# Patient Record
Sex: MALE | Race: WHITE | NOT HISPANIC OR LATINO
[De-identification: names, ages, dates, MRNs, and addresses within clinical notes are randomized per-mention and may not be internally consistent; named-entity substitution may affect disease eponyms.]

---

## 2017-01-10 ENCOUNTER — APPOINTMENT (OUTPATIENT)
Dept: ORTHOPEDIC SURGERY | Facility: CLINIC | Age: 52
End: 2017-01-10

## 2017-01-10 VITALS — BODY MASS INDEX: 29.16 KG/M2 | HEIGHT: 73 IN | WEIGHT: 220 LBS | RESPIRATION RATE: 16 BRPM

## 2017-01-10 PROBLEM — Z00.00 ENCOUNTER FOR PREVENTIVE HEALTH EXAMINATION: Status: ACTIVE | Noted: 2017-01-10

## 2017-01-10 RX ORDER — LAMOTRIGINE 100 MG/1
100 TABLET ORAL
Refills: 0 | Status: ACTIVE | COMMUNITY

## 2018-01-31 ENCOUNTER — APPOINTMENT (OUTPATIENT)
Dept: ORTHOPEDIC SURGERY | Facility: CLINIC | Age: 53
End: 2018-01-31
Payer: COMMERCIAL

## 2018-01-31 VITALS — BODY MASS INDEX: 29.16 KG/M2 | HEIGHT: 73 IN | RESPIRATION RATE: 16 BRPM | WEIGHT: 220 LBS

## 2018-01-31 DIAGNOSIS — M65.342 TRIGGER FINGER, LEFT RING FINGER: ICD-10-CM

## 2018-01-31 DIAGNOSIS — M65.332 TRIGGER FINGER, LEFT MIDDLE FINGER: ICD-10-CM

## 2018-01-31 DIAGNOSIS — M65.351 TRIGGER FINGER, RIGHT LITTLE FINGER: ICD-10-CM

## 2018-01-31 DIAGNOSIS — M65.352 TRIGGER FINGER, LEFT LITTLE FINGER: ICD-10-CM

## 2018-01-31 PROCEDURE — 20550 NJX 1 TENDON SHEATH/LIGAMENT: CPT | Mod: LT

## 2018-01-31 PROCEDURE — 99214 OFFICE O/P EST MOD 30 MIN: CPT | Mod: 25

## 2020-02-19 ENCOUNTER — APPOINTMENT (OUTPATIENT)
Dept: UROLOGY | Facility: CLINIC | Age: 55
End: 2020-02-19
Payer: COMMERCIAL

## 2020-02-19 VITALS
SYSTOLIC BLOOD PRESSURE: 123 MMHG | HEART RATE: 80 BPM | DIASTOLIC BLOOD PRESSURE: 68 MMHG | WEIGHT: 225 LBS | HEIGHT: 73 IN | BODY MASS INDEX: 29.82 KG/M2

## 2020-02-19 DIAGNOSIS — Z87.891 PERSONAL HISTORY OF NICOTINE DEPENDENCE: ICD-10-CM

## 2020-02-19 DIAGNOSIS — F17.290 NICOTINE DEPENDENCE, OTHER TOBACCO PRODUCT, UNCOMPLICATED: ICD-10-CM

## 2020-02-19 DIAGNOSIS — Z83.3 FAMILY HISTORY OF DIABETES MELLITUS: ICD-10-CM

## 2020-02-19 DIAGNOSIS — Z78.9 OTHER SPECIFIED HEALTH STATUS: ICD-10-CM

## 2020-02-19 DIAGNOSIS — Z87.442 PERSONAL HISTORY OF URINARY CALCULI: ICD-10-CM

## 2020-02-19 PROCEDURE — 99203 OFFICE O/P NEW LOW 30 MIN: CPT

## 2020-02-19 NOTE — LETTER HEADER
[FreeTextEntry3] : Omayra Rutherford M.D.\par Director of Urology\par [facility]\par [address] [place] [address], [address]\[place] [address]

## 2020-02-19 NOTE — HISTORY OF PRESENT ILLNESS
[Erectile Dysfunction] : Erectile Dysfunction [FreeTextEntry1] : Sean is a 54-year-old male who was sent for subspecialty consultation regarding erectile dysfunction. He is following up with Dr. Reardon for general urology.\par \par Patient states he has progressively worsening erectile dysfunction and that has been going on for greater than 5 years. Currently he is unable to obtain or maintain any erection even with PDE five inhibitors. He is able to ejaculate.\par \par He has tried sildenafil at up to 100 mg, with somewhat of a response that has now waned and most recently Cialis 10 mg with no significant change\par \par He has a history of infertility with poor motility and low sperm count. He says he has a history of varicocele however, was never corrected. His wife conceived via IVF\par \par Finally he tells us that he’s had failure to perform lack of erections his whole life and I explained to him that if that was the only issue sildenafil should’ve worked at the hundred milligram dose as a usual dose for psychiatric ED is 25 mg.

## 2020-02-19 NOTE — LETTER BODY
[Dear  ___] : Dear  [unfilled], [Consult Letter:] : I had the pleasure of evaluating your patient, [unfilled]. [Please see my note below.] : Please see my note below. [Consult Closing:] : Thank you very much for allowing me to participate in the care of this patient.  If you have any questions, please do not hesitate to contact me. [Sincerely,] : Sincerely, [FreeTextEntry2] : Dr. Mal Brandt \par [address]\[place] [address]\par

## 2020-02-19 NOTE — ASSESSMENT
[FreeTextEntry1] : He has erectile dysfunction refractory to PDE 5 inhibitors. We'll obtain [place] criteria and a full hormone panel to have a follow up for review. \par \par He cannot understand why he needed [place] criteria as he walks a lot of work however he is working in a slow pace does not get his heart rate up and says he cannot claim two or three flights of stairs, in essence given that the penis is a window to the heart and that ED predates cardiac disease by sometimes only three or even five years and he’s had this for five years he needs [place] criteria classification before we can treat him with ED issues. Obviously if it is a hormonal issue that can be treated separately and may obviate both situations

## 2020-02-19 NOTE — PHYSICAL EXAM
[General Appearance - Well Developed] : well developed [General Appearance - Well Nourished] : well nourished [Normal Appearance] : normal appearance [Well Groomed] : well groomed [General Appearance - In No Acute Distress] : no acute distress [Heart Rate And Rhythm] : Heart rate and rhythm were normal [Edema] : no peripheral edema [Respiration, Rhythm And Depth] : normal respiratory rhythm and effort [Exaggerated Use Of Accessory Muscles For Inspiration] : no accessory muscle use [Auscultation Breath Sounds / Voice Sounds] : lungs were clear to auscultation bilaterally [Abdomen Soft] : soft [Abdomen Tenderness] : non-tender [Urethral Meatus] : meatus normal [Costovertebral Angle Tenderness] : no ~M costovertebral angle tenderness [Penis Abnormality] : normal circumcised penis [Urinary Bladder Findings] : the bladder was normal on palpation [Scrotum] : the scrotum was normal [Epididymis] : the epididymides were normal [Testes Mass (___cm)] : there were no testicular masses [Testes Tenderness] : no tenderness of the testes [Normal Station and Gait] : the gait and station were normal for the patient's age [] : no rash [No Focal Deficits] : no focal deficits [Oriented To Time, Place, And Person] : oriented to person, place, and time [Affect] : the affect was normal [Mood] : the mood was normal [Not Anxious] : not anxious [Femoral Lymph Nodes Enlarged Bilaterally] : femoral [Inguinal Lymph Nodes Enlarged Bilaterally] : inguinal [FreeTextEntry1] : Penile atrophy. Bilateral testicular atrophy Left 20 cc Right 22 cc. Neurovascular tethering. IVY not done as pt said just done by Dr. Reardon

## 2020-04-20 ENCOUNTER — APPOINTMENT (OUTPATIENT)
Dept: UROLOGY | Facility: CLINIC | Age: 55
End: 2020-04-20
Payer: COMMERCIAL

## 2020-04-20 PROCEDURE — 99214 OFFICE O/P EST MOD 30 MIN: CPT | Mod: 95

## 2020-04-20 NOTE — PHYSICAL EXAM
[] : no respiratory distress [Exaggerated Use Of Accessory Muscles For Inspiration] : no accessory muscle use [Respiration, Rhythm And Depth] : normal respiratory rhythm and effort

## 2020-04-29 ENCOUNTER — APPOINTMENT (OUTPATIENT)
Dept: UROLOGY | Facility: CLINIC | Age: 55
End: 2020-04-29

## 2020-05-04 ENCOUNTER — TRANSCRIPTION ENCOUNTER (OUTPATIENT)
Age: 55
End: 2020-05-04

## 2020-06-10 ENCOUNTER — APPOINTMENT (OUTPATIENT)
Dept: UROLOGY | Facility: CLINIC | Age: 55
End: 2020-06-10
Payer: COMMERCIAL

## 2020-06-10 VITALS
BODY MASS INDEX: 31.81 KG/M2 | DIASTOLIC BLOOD PRESSURE: 69 MMHG | WEIGHT: 240 LBS | HEART RATE: 78 BPM | SYSTOLIC BLOOD PRESSURE: 113 MMHG | TEMPERATURE: 97.9 F | HEIGHT: 73 IN

## 2020-06-10 PROCEDURE — 99214 OFFICE O/P EST MOD 30 MIN: CPT

## 2020-06-10 NOTE — ASSESSMENT
[FreeTextEntry1] : we discussed the possibilities that testosterone replacement might help his response. He will see if his insurance allows Androgel 1 .62% started to punch per day get labs in three and see me in four. If we normalizes testosterone and he then response to PDE five inhibitors we have our answer. He would then have to decide what form of testosterone to keep him on long term.\par If he does not normalizes testosterone we will try higher doses or different formulations and if he then normalizes we could then see the response to the PDE five inhibitors.\par If he does not normalize we will try a different method of drug i.e. either the Xyosted or testosterone cypionate and once we get him to a normal level we would then we challenge the PDE five inhibitors.\par \par If he is eventually normalize and does not respond to PDE five inhibitors he would then go to a Doppler study and consider i.e. self injection program versus an implant.\par

## 2020-06-10 NOTE — HISTORY OF PRESENT ILLNESS
[Currently Experiencing ___] :  [unfilled] [FreeTextEntry1] : Sean is a 54-year-old male who had been seeing another urologist with erectile dysfunction and found that over time PDE five inhibitors were not working. We saw him, we tested his testosterone that had a low bioavailable with a borderline free and total. We arranged to repeat labs getting a PSA is will this time and is here now for review. [Erectile Dysfunction] : Erectile Dysfunction

## 2020-06-10 NOTE — ASSESSMENT
[FreeTextEntry1] : I’m going to recommend repeating the hormones and if real consider replacement. I don’t have a PSA so we will order one with a repeat bloods. If normal than he would need to consider injections or continue with the sildenafil but using a Stay-Erec ring.\par \par After a discussion of the risk benefits he is going to get one of the ring kits and try with sildenafil, will get repeat blood test including hormones and a PSA and then decide if he needs treatment with testosterone or if we should go for a Doppler study. Please note if the repeat bloods are normal we would probably go for a third so we have two out of three waiting to get him in for a Doppler study.\par

## 2020-06-10 NOTE — LETTER BODY
[Dear  ___] : Dear  [unfilled], [Please see my note below.] : Please see my note below. [Courtesy Letter:] : I had the pleasure of seeing your patient, [unfilled], in my office today. [Sincerely,] : Sincerely, [FreeTextEntry2] : Mal Brandt MD\par [address]\[place] [address]\par

## 2020-06-10 NOTE — LETTER HEADER
[FreeTextEntry3] : Omayra Rutherford M.D.\par Director of Urology\par [facility]\par [address] [place] [address], [address]\[place] [address]

## 2020-06-10 NOTE — PHYSICAL EXAM
[General Appearance - Well Developed] : well developed [Well Groomed] : well groomed [Normal Appearance] : normal appearance [General Appearance - Well Nourished] : well nourished [Edema] : no peripheral edema [General Appearance - In No Acute Distress] : no acute distress [Respiration, Rhythm And Depth] : normal respiratory rhythm and effort [] : no respiratory distress [Oriented To Time, Place, And Person] : oriented to person, place, and time [Affect] : the affect was normal [Exaggerated Use Of Accessory Muscles For Inspiration] : no accessory muscle use [Not Anxious] : not anxious [Mood] : the mood was normal [Normal Station and Gait] : the gait and station were normal for the patient's age

## 2020-06-10 NOTE — HISTORY OF PRESENT ILLNESS
[Patient] : the patient [Erectile Dysfunction] : Erectile Dysfunction [Currently Experiencing ___] :  [unfilled] [FreeTextEntry1] : The patient could not get to the [employer] to work neither with SMS or email. I ended up communicating with him through the [employer] which is [employer] compliant\par \par Please note he told me he had been sent the paperwork but he found it confusing. I went over the consent with him real time and he accepted\par \par Sean is a 54-year-old male sent here for a subspecialty evaluation of his erectile dysfunction with his general urology been followed up with Dr. Reardon. He was seen on 9/19/2020 and he tells me that his problem has been ongoing for over five years and currently even with PDE five inhibitors at the maximum dose in sildenafil at hundred and Cialis only up to 10 mg he gets no significant rigidity. He is able to ejaculate but it is with a soft penis.. He did note that he’s had trouble with erections his whole life possibly psychogenic and that was the major cause then sildenafil should’ve worked at the hundred milligram dose if not less. He has a strong smoking history both cigarettes in the past and no cigars\par the physical exam showed penile atrophy and testicular exam that was slightly soft and on the border of normal size with the left at 20 right at 22 mL patient had declined a IVY\par \par I requested [place] criteria, blood tests and follow-up to review.\par

## 2020-06-10 NOTE — REVIEW OF SYSTEMS
[see HPI] : see HPI [Erectile Dysfunction] : erectile dysfunction [Negative] : Psychiatric [FreeTextEntry1] : his other  issues he is having managed by Dr. Reardon

## 2020-07-06 ENCOUNTER — APPOINTMENT (OUTPATIENT)
Dept: UROLOGY | Facility: CLINIC | Age: 55
End: 2020-07-06
Payer: COMMERCIAL

## 2020-07-06 VITALS
SYSTOLIC BLOOD PRESSURE: 118 MMHG | TEMPERATURE: 98 F | DIASTOLIC BLOOD PRESSURE: 78 MMHG | WEIGHT: 240 LBS | HEART RATE: 79 BPM | BODY MASS INDEX: 31.81 KG/M2 | HEIGHT: 73 IN

## 2020-07-06 PROCEDURE — 99213 OFFICE O/P EST LOW 20 MIN: CPT

## 2020-07-06 NOTE — LETTER BODY
[Dear  ___] : Dear  [unfilled], [Courtesy Letter:] : I had the pleasure of seeing your patient, [unfilled], in my office today. [Please see my note below.] : Please see my note below. [Sincerely,] : Sincerely, [FreeTextEntry2] : Mal Brandt MD\par [address]\[place] [address]\par

## 2020-07-06 NOTE — ASSESSMENT
[FreeTextEntry1] : He is doing well on AndroGel and will continue. His erections have returned enough for him to have intercourse with Cialis and he'll continue with the medication. He'll follow up in 3 months with blood work before

## 2020-07-06 NOTE — HISTORY OF PRESENT ILLNESS
[Currently Experiencing ___] :  [unfilled] [Erectile Dysfunction] : Erectile Dysfunction [FreeTextEntry1] : Sean is a 54-year-old male who we have been following for erectile dysfunction and low testosterone.\par \par His ED was refractory to PDE 5 inhibitors and his hormone panel demonstrated low testosterone levels.\par \par At his last visit he was started on AndroGel 2 pumps per day and reports a significant improvement in his libido/energy. Additionally Cialis is now working well enough to give him a decent enough erection and though he’d like it better he is not willing to do anything more invasive than Cialis at the 20 mg dose.\par \par He presents today to review his blood work as well as his response and a focused exam\par

## 2020-07-06 NOTE — LETTER HEADER
[FreeTextEntry3] : Omayra Rutherford M.D.\par Director of Urology\par [facility]\par [address] [place] [address], [address]\[place] [address]

## 2020-07-09 LAB
% FREE TESTOSTERONE - ESO: 2 %
ALBUMIN SERPL ELPH-MCNC: 4.4 G/DL
ALP BLD-CCNC: 53 U/L
ALT SERPL-CCNC: 23 U/L
AST SERPL-CCNC: 17 U/L
BASOPHILS # BLD AUTO: 0.02 K/UL
BASOPHILS NFR BLD AUTO: 0.4 %
BILIRUB DIRECT SERPL-MCNC: <0.2 MG/DL
BILIRUB INDIRECT SERPL-MCNC: >0.4 MG/DL
BILIRUB SERPL-MCNC: 0.6 MG/DL
EOSINOPHIL # BLD AUTO: 0.05 K/UL
EOSINOPHIL NFR BLD AUTO: 1 %
ESTRADIOL SERPL-MCNC: 32 PG/ML
FREE TESTOSTERONE - ESO: 124 PG/ML
HCT VFR BLD CALC: 44.8 %
HGB BLD-MCNC: 14.1 G/DL
IMM GRANULOCYTES NFR BLD AUTO: 0.2 %
LH SERPL-ACNC: 2.4 IU/L
LYMPHOCYTES # BLD AUTO: 1.52 K/UL
LYMPHOCYTES NFR BLD AUTO: 31.5 %
MAN DIFF?: NORMAL
MCHC RBC-ENTMCNC: 28.4 PG
MCHC RBC-ENTMCNC: 31.5 G/DL
MCV RBC AUTO: 90.3 FL
MONOCYTES # BLD AUTO: 0.5 K/UL
MONOCYTES NFR BLD AUTO: 10.4 %
NEUTROPHILS # BLD AUTO: 2.73 K/UL
NEUTROPHILS NFR BLD AUTO: 56.5 %
PLATELET # BLD AUTO: 147 K/UL
PROLACTIN SERPL-MCNC: 9.1 NG/ML
PROT SERPL-MCNC: 6.7 G/DL
RBC # BLD: 4.96 M/UL
RBC # FLD: 13.5 %
SHBG SERPL-SCNC: 36 NMOL/L
SHBG-ESOTERIX: 37.2 NMOL/L
TESTOST BND SERPL-MCNC: 12 NG/DL
TESTOST BND SERPL-MCNC: 14.3 PG/ML
TESTOST SERPL-MCNC: 575.9 NG/DL
TESTOSTERONE BIOAVAILABLE: 172 NG/DL
TESTOSTERONE SERUM - ESO: 619 NG/DL
TESTOSTERONE TOTAL S: 573 NG/DL
WBC # FLD AUTO: 4.83 K/UL

## 2020-10-12 LAB
ALBUMIN SERPL ELPH-MCNC: 4.3 G/DL
ALP BLD-CCNC: 52 U/L
ALT SERPL-CCNC: 26 U/L
AST SERPL-CCNC: 18 U/L
BASOPHILS # BLD AUTO: 0.03 K/UL
BASOPHILS NFR BLD AUTO: 0.6 %
BILIRUB DIRECT SERPL-MCNC: <0.2 MG/DL
BILIRUB INDIRECT SERPL-MCNC: >0.5 MG/DL
BILIRUB SERPL-MCNC: 0.7 MG/DL
EOSINOPHIL # BLD AUTO: 0.08 K/UL
EOSINOPHIL NFR BLD AUTO: 1.5 %
ESTRADIOL SERPL-MCNC: 28 PG/ML
HCT VFR BLD CALC: 43 %
HGB BLD-MCNC: 14 G/DL
IMM GRANULOCYTES NFR BLD AUTO: 0.4 %
LYMPHOCYTES # BLD AUTO: 1.53 K/UL
LYMPHOCYTES NFR BLD AUTO: 29.5 %
MAN DIFF?: NORMAL
MCHC RBC-ENTMCNC: 29.2 PG
MCHC RBC-ENTMCNC: 32.6 G/DL
MCV RBC AUTO: 89.8 FL
MONOCYTES # BLD AUTO: 0.6 K/UL
MONOCYTES NFR BLD AUTO: 11.6 %
NEUTROPHILS # BLD AUTO: 2.93 K/UL
NEUTROPHILS NFR BLD AUTO: 56.4 %
PLATELET # BLD AUTO: 145 K/UL
PROT SERPL-MCNC: 6.4 G/DL
RBC # BLD: 4.79 M/UL
RBC # FLD: 13.5 %
WBC # FLD AUTO: 5.19 K/UL

## 2020-10-14 ENCOUNTER — APPOINTMENT (OUTPATIENT)
Dept: UROLOGY | Facility: CLINIC | Age: 55
End: 2020-10-14
Payer: COMMERCIAL

## 2020-10-14 VITALS
BODY MASS INDEX: 30.48 KG/M2 | DIASTOLIC BLOOD PRESSURE: 74 MMHG | SYSTOLIC BLOOD PRESSURE: 123 MMHG | TEMPERATURE: 97.6 F | HEIGHT: 73 IN | WEIGHT: 230 LBS | HEART RATE: 77 BPM

## 2020-10-14 DIAGNOSIS — F32.9 MAJOR DEPRESSIVE DISORDER, SINGLE EPISODE, UNSPECIFIED: ICD-10-CM

## 2020-10-14 DIAGNOSIS — R68.82 DECREASED LIBIDO: ICD-10-CM

## 2020-10-14 DIAGNOSIS — R79.89 OTHER SPECIFIED ABNORMAL FINDINGS OF BLOOD CHEMISTRY: ICD-10-CM

## 2020-10-14 PROCEDURE — 99214 OFFICE O/P EST MOD 30 MIN: CPT

## 2020-10-14 RX ORDER — TESTOSTERONE 16.2 MG/G
20.25 MG/ACT GEL TRANSDERMAL
Qty: 3 | Refills: 0 | Status: DISCONTINUED | COMMUNITY
Start: 2020-06-10 | End: 2020-07-13

## 2020-10-14 NOTE — PHYSICAL EXAM
[General Appearance - Well Developed] : well developed [Normal Appearance] : normal appearance [Well Groomed] : well groomed [General Appearance - Well Nourished] : well nourished [] : no respiratory distress [General Appearance - In No Acute Distress] : no acute distress [Oriented To Time, Place, And Person] : oriented to person, place, and time [Respiration, Rhythm And Depth] : normal respiratory rhythm and effort [Exaggerated Use Of Accessory Muscles For Inspiration] : no accessory muscle use [Affect] : the affect was normal [FreeTextEntry1] : somewhat upset about issue more so about not knowing if it is physical or emotional [Mood] : the mood was normal [Normal Station and Gait] : the gait and station were normal for the patient's age

## 2020-10-14 NOTE — HISTORY OF PRESENT ILLNESS
[FreeTextEntry1] : wife and he  07/13/2020 and he left T at home and cant get\par able to get some clothes as needed\par without T still has libido and since separation has new partner but even with tadalafil penis doesn’t work\par \par he is therapy and is not sure if issue is supra or infra-tentorial [Currently Experiencing ___] :  [unfilled] [Erectile Dysfunction] : Erectile Dysfunction

## 2020-10-14 NOTE — REVIEW OF SYSTEMS
[Erectile Dysfunction] : erectile dysfunction [see HPI] : see HPI [FreeTextEntry1] : his other  issues he is having managed by Dr. Reardon [Negative] : Heme/Lymph

## 2020-10-14 NOTE — ASSESSMENT
[FreeTextEntry1] : ? physical or emotional if emotional he needs stress management possibly PEP\par if physical needs PEP as pills do not work

## 2020-10-14 NOTE — LETTER HEADER
[FreeTextEntry3] : Omayra Rutherford M.D.\par Director of Urology\par [facility]\par [address] [place] [address], [address]\[place] [address]

## 2020-10-14 NOTE — LETTER BODY
[Dear  ___] : Dear  [unfilled], [Please see my note below.] : Please see my note below. [Courtesy Letter:] : I had the pleasure of seeing your patient, [unfilled], in my office today. [FreeTextEntry2] : Mal Brandt MD\par [address]\[place] [address]\par  [Sincerely,] : Sincerely,

## 2020-10-15 LAB
SHBG SERPL-SCNC: 37 NMOL/L
TESTOST BND SERPL-MCNC: 15.8 NG/DL
TESTOSTERONE BIOAVAILABLE: 116 NG/DL
TESTOSTERONE TOTAL S: 465 NG/DL

## 2020-10-19 LAB
% FREE TESTOSTERONE - ESO: 2.2 %
FREE TESTOSTERONE - ESO: 105 PG/ML
SHBG-ESOTERIX: 35.9 NMOL/L
TESTOST BND SERPL-MCNC: 14 PG/ML
TESTOST SERPL-MCNC: 398.9 NG/DL
TESTOSTERONE SERUM - ESO: 475 NG/DL

## 2020-11-20 ENCOUNTER — APPOINTMENT (OUTPATIENT)
Dept: UROLOGY | Facility: CLINIC | Age: 55
End: 2020-11-20
Payer: COMMERCIAL

## 2020-11-20 VITALS
DIASTOLIC BLOOD PRESSURE: 68 MMHG | HEART RATE: 84 BPM | TEMPERATURE: 97.1 F | SYSTOLIC BLOOD PRESSURE: 128 MMHG | HEIGHT: 73 IN

## 2020-11-20 PROCEDURE — 93980 PENILE VASCULAR STUDY: CPT

## 2020-11-20 PROCEDURE — 54235 NJX CORPORA CAVERNOSA RX AGT: CPT

## 2020-11-24 ENCOUNTER — APPOINTMENT (OUTPATIENT)
Dept: UROLOGY | Facility: CLINIC | Age: 55
End: 2020-11-24
Payer: COMMERCIAL

## 2020-11-24 VITALS
HEART RATE: 82 BPM | DIASTOLIC BLOOD PRESSURE: 73 MMHG | SYSTOLIC BLOOD PRESSURE: 128 MMHG | WEIGHT: 226 LBS | BODY MASS INDEX: 29.95 KG/M2 | TEMPERATURE: 97.7 F | HEIGHT: 73 IN

## 2020-11-24 DIAGNOSIS — I25.10 ATHEROSCLEROTIC HEART DISEASE OF NATIVE CORONARY ARTERY W/OUT ANGINA PECTORIS: ICD-10-CM

## 2020-11-24 PROCEDURE — 99214 OFFICE O/P EST MOD 30 MIN: CPT

## 2020-11-24 NOTE — HISTORY OF PRESENT ILLNESS
[Erectile Dysfunction] : Erectile Dysfunction [FreeTextEntry1] : Sean is a 55-year-old male who in the fall for erectile dysfunction.\par \par His erectile dysfunction has been refractory to Cialis he presented for a Doppler on 11/20/20. Doppler study demonstrated thickened arterial wall with venous leak. He needed a ring however, he reports that he was unable to keep an erection despite the ring and used to PDE 5 inhibitors at home. He presents today to discuss options.\par \par Please note with respect to the question of low testosterone if anything cares high levels as was proven on the blood test October 14, 2020 where  the bioavailable was 116 but the free was 15.8 ctober 20, 2020\par \par

## 2020-11-24 NOTE — ASSESSMENT
[FreeTextEntry1] : We reviewed his images from his Doppler study showing him on screen where his better wall thickness, not normal but better as compared to one that was .4 mm thick and showed a narrow lumen. This helped us understand his lack of response from PDE 5 inhibitors as well as injectables that though better with a ring were still not good enough. We offered him trying injections with a higher concentration in dose. However, he really is not interested in doing self injection every time especially if he has to use a constrictive device. We arranged that he will followup with Dr. Schaefer for discussion regarding inflatable penile implant.\par \par Additionally we recommend follow up with his PCP for cardiac workup as he has obvious atherosclerosis of the penis. It is well-known or at least agreed to by urologist that atherosclerotic disease of the penis can precede atherosclerotic disease by 3 to 5 years. It does not mean that he has or will have just places him in a higher risk group and perhaps a more in-depth evaluation and/or aggressive preventive measures can be entertained\par \par We will draw a PSA as he is due and lately given the trouble getting into labs as he is here we will take care of it for him. He tells me he is not had orgasm since the day of the Doppler which was four days ago\par

## 2020-11-24 NOTE — LETTER HEADER
[FreeTextEntry3] : Omayra Rutherford M.D.\par Director of Urology\par [facility]\par [address] [place] [address], [address]\[place] [address]

## 2020-11-24 NOTE — LETTER BODY
[Dear  ___] : Dear  [unfilled], [Courtesy Letter:] : I had the pleasure of seeing your patient, [unfilled], in my office today. [Please see my note below.] : Please see my note below. [Sincerely,] : Sincerely, [FreeTextEntry2] : Mal Brandt MD\par [address]\[place] [address]\par

## 2020-12-03 LAB
PSA FREE FLD-MCNC: 44 %
PSA FREE SERPL-MCNC: 0.21 NG/ML
PSA SERPL-MCNC: 0.47 NG/ML

## 2020-12-16 ENCOUNTER — APPOINTMENT (OUTPATIENT)
Dept: UROLOGY | Facility: CLINIC | Age: 55
End: 2020-12-16
Payer: COMMERCIAL

## 2020-12-16 VITALS
WEIGHT: 226 LBS | HEIGHT: 73 IN | BODY MASS INDEX: 29.95 KG/M2 | SYSTOLIC BLOOD PRESSURE: 137 MMHG | DIASTOLIC BLOOD PRESSURE: 67 MMHG | HEART RATE: 74 BPM | TEMPERATURE: 98.9 F

## 2020-12-16 PROCEDURE — 99214 OFFICE O/P EST MOD 30 MIN: CPT

## 2020-12-16 PROCEDURE — 99072 ADDL SUPL MATRL&STAF TM PHE: CPT

## 2020-12-18 NOTE — HISTORY OF PRESENT ILLNESS
[FreeTextEntry1] : Sean is a 55-year-old male who presents for erectile dysfunction.\par \par His erectile dysfunction has been refractory to Cialis he presented for a Doppler on 11/20/20. Doppler study demonstrated thickened arterial wall with venous leak.\par \par He also tried penile injections with no success.\par \par  He needed a ring however, he reports that he was unable to keep an erection despite the ring and used to PDE 5 inhibitors at home. He presents today to discuss options.\par \par Oct 2020--- testosterone 465\par \par Also interested in vasectomy at the same time

## 2020-12-18 NOTE — PHYSICAL EXAM
[General Appearance - Well Developed] : well developed [General Appearance - Well Nourished] : well nourished [Normal Appearance] : normal appearance [Well Groomed] : well groomed [General Appearance - In No Acute Distress] : no acute distress [Abdomen Soft] : soft [Abdomen Tenderness] : non-tender [Costovertebral Angle Tenderness] : no ~M costovertebral angle tenderness [Urethral Meatus] : meatus normal [Scrotum] : the scrotum was normal [Testes Mass (___cm)] : there were no testicular masses [Skin Color & Pigmentation] : normal skin color and pigmentation [Edema] : no peripheral edema [] : no respiratory distress [Respiration, Rhythm And Depth] : normal respiratory rhythm and effort [Exaggerated Use Of Accessory Muscles For Inspiration] : no accessory muscle use [Oriented To Time, Place, And Person] : oriented to person, place, and time [Affect] : the affect was normal [Mood] : the mood was normal [Not Anxious] : not anxious [Normal Station and Gait] : the gait and station were normal for the patient's age [No Focal Deficits] : no focal deficits [Femoral Lymph Nodes Enlarged Bilaterally] : femoral [Inguinal Lymph Nodes Enlarged Bilaterally] : inguinal

## 2021-01-19 ENCOUNTER — RESULT REVIEW (OUTPATIENT)
Age: 56
End: 2021-01-19

## 2021-01-19 ENCOUNTER — OUTPATIENT (OUTPATIENT)
Dept: OUTPATIENT SERVICES | Facility: HOSPITAL | Age: 56
LOS: 1 days | Discharge: HOME | End: 2021-01-19
Payer: COMMERCIAL

## 2021-01-19 VITALS
TEMPERATURE: 97 F | WEIGHT: 220.46 LBS | HEIGHT: 73 IN | OXYGEN SATURATION: 99 % | RESPIRATION RATE: 18 BRPM | HEART RATE: 82 BPM | DIASTOLIC BLOOD PRESSURE: 62 MMHG | SYSTOLIC BLOOD PRESSURE: 120 MMHG

## 2021-01-19 DIAGNOSIS — N52.01 ERECTILE DYSFUNCTION DUE TO ARTERIAL INSUFFICIENCY: ICD-10-CM

## 2021-01-19 DIAGNOSIS — Z98.890 OTHER SPECIFIED POSTPROCEDURAL STATES: Chronic | ICD-10-CM

## 2021-01-19 DIAGNOSIS — Z01.818 ENCOUNTER FOR OTHER PREPROCEDURAL EXAMINATION: ICD-10-CM

## 2021-01-19 LAB
ALBUMIN SERPL ELPH-MCNC: 4.3 G/DL — SIGNIFICANT CHANGE UP (ref 3.5–5.2)
ALP SERPL-CCNC: 62 U/L — SIGNIFICANT CHANGE UP (ref 30–115)
ALT FLD-CCNC: 20 U/L — SIGNIFICANT CHANGE UP (ref 0–41)
ANION GAP SERPL CALC-SCNC: 11 MMOL/L — SIGNIFICANT CHANGE UP (ref 7–14)
APPEARANCE UR: CLEAR — SIGNIFICANT CHANGE UP
APTT BLD: 31.6 SEC — SIGNIFICANT CHANGE UP (ref 27–39.2)
AST SERPL-CCNC: 19 U/L — SIGNIFICANT CHANGE UP (ref 0–41)
BACTERIA # UR AUTO: NEGATIVE — SIGNIFICANT CHANGE UP
BASOPHILS # BLD AUTO: 0.03 K/UL — SIGNIFICANT CHANGE UP (ref 0–0.2)
BASOPHILS NFR BLD AUTO: 0.6 % — SIGNIFICANT CHANGE UP (ref 0–1)
BILIRUB SERPL-MCNC: 0.6 MG/DL — SIGNIFICANT CHANGE UP (ref 0.2–1.2)
BILIRUB UR-MCNC: NEGATIVE — SIGNIFICANT CHANGE UP
BUN SERPL-MCNC: 19 MG/DL — SIGNIFICANT CHANGE UP (ref 10–20)
CALCIUM SERPL-MCNC: 9.4 MG/DL — SIGNIFICANT CHANGE UP (ref 8.5–10.1)
CHLORIDE SERPL-SCNC: 105 MMOL/L — SIGNIFICANT CHANGE UP (ref 98–110)
CO2 SERPL-SCNC: 24 MMOL/L — SIGNIFICANT CHANGE UP (ref 17–32)
COLOR SPEC: YELLOW — SIGNIFICANT CHANGE UP
CREAT SERPL-MCNC: 0.9 MG/DL — SIGNIFICANT CHANGE UP (ref 0.7–1.5)
DIFF PNL FLD: NEGATIVE — SIGNIFICANT CHANGE UP
EOSINOPHIL # BLD AUTO: 0.05 K/UL — SIGNIFICANT CHANGE UP (ref 0–0.7)
EOSINOPHIL NFR BLD AUTO: 1 % — SIGNIFICANT CHANGE UP (ref 0–8)
EPI CELLS # UR: 0 /HPF — SIGNIFICANT CHANGE UP (ref 0–5)
GLUCOSE SERPL-MCNC: 96 MG/DL — SIGNIFICANT CHANGE UP (ref 70–99)
GLUCOSE UR QL: NEGATIVE — SIGNIFICANT CHANGE UP
HCT VFR BLD CALC: 46.1 % — SIGNIFICANT CHANGE UP (ref 42–52)
HGB BLD-MCNC: 15.1 G/DL — SIGNIFICANT CHANGE UP (ref 14–18)
HYALINE CASTS # UR AUTO: 0 /LPF — SIGNIFICANT CHANGE UP (ref 0–7)
IMM GRANULOCYTES NFR BLD AUTO: 0.2 % — SIGNIFICANT CHANGE UP (ref 0.1–0.3)
INR BLD: 1.04 RATIO — SIGNIFICANT CHANGE UP (ref 0.65–1.3)
KETONES UR-MCNC: SIGNIFICANT CHANGE UP
LEUKOCYTE ESTERASE UR-ACNC: NEGATIVE — SIGNIFICANT CHANGE UP
LYMPHOCYTES # BLD AUTO: 1.43 K/UL — SIGNIFICANT CHANGE UP (ref 1.2–3.4)
LYMPHOCYTES # BLD AUTO: 27.5 % — SIGNIFICANT CHANGE UP (ref 20.5–51.1)
MCHC RBC-ENTMCNC: 29.2 PG — SIGNIFICANT CHANGE UP (ref 27–31)
MCHC RBC-ENTMCNC: 32.8 G/DL — SIGNIFICANT CHANGE UP (ref 32–37)
MCV RBC AUTO: 89.2 FL — SIGNIFICANT CHANGE UP (ref 80–94)
MONOCYTES # BLD AUTO: 0.56 K/UL — SIGNIFICANT CHANGE UP (ref 0.1–0.6)
MONOCYTES NFR BLD AUTO: 10.8 % — HIGH (ref 1.7–9.3)
NEUTROPHILS # BLD AUTO: 3.12 K/UL — SIGNIFICANT CHANGE UP (ref 1.4–6.5)
NEUTROPHILS NFR BLD AUTO: 59.9 % — SIGNIFICANT CHANGE UP (ref 42.2–75.2)
NITRITE UR-MCNC: NEGATIVE — SIGNIFICANT CHANGE UP
NRBC # BLD: 0 /100 WBCS — SIGNIFICANT CHANGE UP (ref 0–0)
PH UR: 6 — SIGNIFICANT CHANGE UP (ref 5–8)
PLATELET # BLD AUTO: 170 K/UL — SIGNIFICANT CHANGE UP (ref 130–400)
POTASSIUM SERPL-MCNC: 4.3 MMOL/L — SIGNIFICANT CHANGE UP (ref 3.5–5)
POTASSIUM SERPL-SCNC: 4.3 MMOL/L — SIGNIFICANT CHANGE UP (ref 3.5–5)
PROT SERPL-MCNC: 6.7 G/DL — SIGNIFICANT CHANGE UP (ref 6–8)
PROT UR-MCNC: ABNORMAL
PROTHROM AB SERPL-ACNC: 12 SEC — SIGNIFICANT CHANGE UP (ref 9.95–12.87)
RBC # BLD: 5.17 M/UL — SIGNIFICANT CHANGE UP (ref 4.7–6.1)
RBC # FLD: 13.3 % — SIGNIFICANT CHANGE UP (ref 11.5–14.5)
RBC CASTS # UR COMP ASSIST: 5 /HPF — HIGH (ref 0–4)
SODIUM SERPL-SCNC: 140 MMOL/L — SIGNIFICANT CHANGE UP (ref 135–146)
SP GR SPEC: 1.03 — HIGH (ref 1.01–1.03)
UROBILINOGEN FLD QL: ABNORMAL
WBC # BLD: 5.2 K/UL — SIGNIFICANT CHANGE UP (ref 4.8–10.8)
WBC # FLD AUTO: 5.2 K/UL — SIGNIFICANT CHANGE UP (ref 4.8–10.8)
WBC UR QL: 2 /HPF — SIGNIFICANT CHANGE UP (ref 0–5)

## 2021-01-19 PROCEDURE — 93010 ELECTROCARDIOGRAM REPORT: CPT

## 2021-01-19 PROCEDURE — 71046 X-RAY EXAM CHEST 2 VIEWS: CPT | Mod: 26

## 2021-01-19 NOTE — H&P PST ADULT - REASON FOR ADMISSION
56 y/o male presents to [facility] for implantation of inflatable penile prosthesis, bilateral vasectomy with Dr. Schaefer in [facility] [place] on 2/9/21

## 2021-01-19 NOTE — H&P PST ADULT - HISTORY OF PRESENT ILLNESS
54 y/o male presents to [facility] for implantation of inflatable penile prosthesis, bilateral vasectomy with Dr. Schaefer in [facility] [place] on 2/9/21    Pt has a hx of erectile disfunction. He has used testosterone in the past without any benefit noted. Pt is now ready for above procedure. Pt is also having a vasectomy for birth control.    Pt is currently being followed by cardiology due to an abnormal ultrasound. Having a stress test completed on 1/21/21. Pt had seen cardiologist for clearance.     PATIENT CURRENTLY DENIES CHEST PAIN  SHORTNESS OF BREATH  PALPITATIONS,  DYSURIA, OR UPPER RESPIRATORY INFECTION IN PAST 2 WEEKS  EXERCISE  TOLERANCE  1-2 FLIGHT OF STAIRS  WITHOUT SHORTNESS OF BREATH    pt denies any covid s/s, or tested positive in the past  pt advised self quarantine till day of procedure    denies travel outside the [place] in the past 30 days    Anesthesia Alert  NO--Difficult Airway  NO--History of neck surgery or radiation  NO--Limited ROM of neck  NO--History of Malignant hyperthermia  NO--No personal or family history of Pseudocholinesterase deficiency.  NO--Prior Anesthesia Complication  NO--Latex Allergy  NO--Loose teeth  NO--History of Rheumatoid Arthritis  NO--TONY  NO--Other_____    Encounter for other preprocedural examination    Erectile dysfunction due to arterial insufficiency    ^N52.01, Z30.2, 53424, 21562    2/9/21 AMB [facility]    Encounter for other preprocedural examination    Erectile dysfunction due to arterial insufficiency

## 2021-01-20 LAB
CULTURE RESULTS: SIGNIFICANT CHANGE UP
SPECIMEN SOURCE: SIGNIFICANT CHANGE UP

## 2021-02-06 ENCOUNTER — LABORATORY RESULT (OUTPATIENT)
Age: 56
End: 2021-02-06

## 2021-02-06 ENCOUNTER — OUTPATIENT (OUTPATIENT)
Dept: OUTPATIENT SERVICES | Facility: HOSPITAL | Age: 56
LOS: 1 days | Discharge: HOME | End: 2021-02-06

## 2021-02-06 DIAGNOSIS — Z11.59 ENCOUNTER FOR SCREENING FOR OTHER VIRAL DISEASES: ICD-10-CM

## 2021-02-06 DIAGNOSIS — Z98.890 OTHER SPECIFIED POSTPROCEDURAL STATES: Chronic | ICD-10-CM

## 2021-02-06 PROBLEM — N52.9 MALE ERECTILE DYSFUNCTION, UNSPECIFIED: Chronic | Status: ACTIVE | Noted: 2021-01-19

## 2021-02-06 PROBLEM — F32.9 MAJOR DEPRESSIVE DISORDER, SINGLE EPISODE, UNSPECIFIED: Chronic | Status: ACTIVE | Noted: 2021-01-19

## 2021-02-09 ENCOUNTER — OUTPATIENT (OUTPATIENT)
Dept: OUTPATIENT SERVICES | Facility: HOSPITAL | Age: 56
LOS: 1 days | Discharge: HOME | End: 2021-02-09
Payer: COMMERCIAL

## 2021-02-09 ENCOUNTER — APPOINTMENT (OUTPATIENT)
Dept: UROLOGY | Facility: HOSPITAL | Age: 56
End: 2021-02-09

## 2021-02-09 VITALS
HEIGHT: 73 IN | TEMPERATURE: 100 F | RESPIRATION RATE: 14 BRPM | HEART RATE: 74 BPM | SYSTOLIC BLOOD PRESSURE: 129 MMHG | OXYGEN SATURATION: 99 % | WEIGHT: 220.02 LBS | DIASTOLIC BLOOD PRESSURE: 71 MMHG

## 2021-02-09 VITALS
RESPIRATION RATE: 16 BRPM | SYSTOLIC BLOOD PRESSURE: 119 MMHG | DIASTOLIC BLOOD PRESSURE: 66 MMHG | HEART RATE: 70 BPM | OXYGEN SATURATION: 99 %

## 2021-02-09 DIAGNOSIS — Z98.890 OTHER SPECIFIED POSTPROCEDURAL STATES: Chronic | ICD-10-CM

## 2021-02-09 PROCEDURE — 54405 INSERT MULTI-COMP PENIS PROS: CPT

## 2021-02-09 PROCEDURE — 55250 REMOVAL OF SPERM DUCT(S): CPT

## 2021-02-09 RX ORDER — CELECOXIB 200 MG/1
200 CAPSULE ORAL ONCE
Refills: 0 | Status: COMPLETED | OUTPATIENT
Start: 2021-02-09 | End: 2021-02-09

## 2021-02-09 RX ORDER — AZTREONAM 2 G
1 VIAL (EA) INJECTION
Qty: 14 | Refills: 0
Start: 2021-02-09 | End: 2021-02-15

## 2021-02-09 RX ORDER — HYDROMORPHONE HYDROCHLORIDE 2 MG/ML
0.5 INJECTION INTRAMUSCULAR; INTRAVENOUS; SUBCUTANEOUS
Refills: 0 | Status: DISCONTINUED | OUTPATIENT
Start: 2021-02-09 | End: 2021-02-09

## 2021-02-09 RX ORDER — OXYCODONE HYDROCHLORIDE 5 MG/1
1 TABLET ORAL
Qty: 12 | Refills: 0
Start: 2021-02-09 | End: 2021-02-11

## 2021-02-09 RX ORDER — DOCUSATE SODIUM 100 MG
1 CAPSULE ORAL
Qty: 42 | Refills: 0
Start: 2021-02-09 | End: 2021-02-22

## 2021-02-09 RX ORDER — SODIUM CHLORIDE 9 MG/ML
1000 INJECTION, SOLUTION INTRAVENOUS
Refills: 0 | Status: DISCONTINUED | OUTPATIENT
Start: 2021-02-09 | End: 2021-02-09

## 2021-02-09 RX ORDER — LAMOTRIGINE 25 MG/1
1 TABLET, ORALLY DISINTEGRATING ORAL
Qty: 0 | Refills: 0 | DISCHARGE

## 2021-02-09 RX ORDER — ACETAMINOPHEN 500 MG
3 TABLET ORAL
Qty: 168 | Refills: 0
Start: 2021-02-09 | End: 2021-02-22

## 2021-02-09 RX ORDER — VANCOMYCIN HCL 1 G
1000 VIAL (EA) INTRAVENOUS ONCE
Refills: 0 | Status: COMPLETED | OUTPATIENT
Start: 2021-02-09 | End: 2021-02-09

## 2021-02-09 RX ORDER — ACETAMINOPHEN 500 MG
975 TABLET ORAL ONCE
Refills: 0 | Status: COMPLETED | OUTPATIENT
Start: 2021-02-09 | End: 2021-02-09

## 2021-02-09 RX ORDER — GABAPENTIN 400 MG/1
300 CAPSULE ORAL ONCE
Refills: 0 | Status: COMPLETED | OUTPATIENT
Start: 2021-02-09 | End: 2021-02-09

## 2021-02-09 RX ORDER — IBUPROFEN 200 MG
1 TABLET ORAL
Qty: 56 | Refills: 0
Start: 2021-02-09 | End: 2021-02-22

## 2021-02-09 RX ORDER — HYDROMORPHONE HYDROCHLORIDE 2 MG/ML
1 INJECTION INTRAMUSCULAR; INTRAVENOUS; SUBCUTANEOUS
Refills: 0 | Status: DISCONTINUED | OUTPATIENT
Start: 2021-02-09 | End: 2021-02-09

## 2021-02-09 RX ORDER — ONDANSETRON 8 MG/1
4 TABLET, FILM COATED ORAL ONCE
Refills: 0 | Status: DISCONTINUED | OUTPATIENT
Start: 2021-02-09 | End: 2021-02-09

## 2021-02-09 RX ADMIN — Medication 250 MILLIGRAM(S): at 08:41

## 2021-02-09 RX ADMIN — Medication 975 MILLIGRAM(S): at 08:40

## 2021-02-09 RX ADMIN — CELECOXIB 200 MILLIGRAM(S): 200 CAPSULE ORAL at 08:39

## 2021-02-09 RX ADMIN — SODIUM CHLORIDE 125 MILLILITER(S): 9 INJECTION, SOLUTION INTRAVENOUS at 11:55

## 2021-02-09 RX ADMIN — GABAPENTIN 300 MILLIGRAM(S): 400 CAPSULE ORAL at 08:39

## 2021-02-09 NOTE — BRIEF OPERATIVE NOTE - NSICDXBRIEFPREOP_GEN_ALL_CORE_FT
PRE-OP DIAGNOSIS:  Erectile dysfunction due to arterial insufficiency 09-Feb-2021 12:03:24  Marek Schaefer  Vasectomy status 09-Feb-2021 12:03:14  Marek Schaefer

## 2021-02-09 NOTE — CHART NOTE - NSCHARTNOTEFT_GEN_A_CORE
PACU ANESTHESIA ADMISSION NOTE      Procedure:   Post op diagnosis:      ____  Intubated  TV:______       Rate: ______      FiO2: ______    __x__  Patent Airway    __x__  Full return of protective reflexes    __x__  Full recovery from anesthesia / back to baseline status    Vitals:  T(C): 37.6 (02-09-21 @ 08:23), Max: 37.6 (02-09-21 @ 07:51)  HR: 74 (02-09-21 @ 08:23) (74 - 74)  BP: 129/71 (02-09-21 @ 08:23) (129/71 - 129/71)  RR: 14 (02-09-21 @ 08:23) (14 - 14)  SpO2: 99% (02-09-21 @ 08:23) (99% - 99%)    Mental Status:  __x__ Awake   ___x__ Alert   _____ Drowsy   _____ Sedated    Nausea/Vomiting:  __x__ NO  ______Yes,   See Post - Op Orders          Pain Scale (0-10):  _____    Treatment: ____ None    __x__ See Post - Op/PCA Orders    Post - Operative Fluids:   ____ Oral   __x__ See Post - Op Orders    Plan: Discharge:   _X___Home       _____Floor     _____Critical Care    _____  Other:_________________    Comments: Patient had smooth intraoperative event, no anesthesia complication.  PACU Vital signs: HR:    84        BP:   116     / 74         RR: 18            O2 Sat:    98   %     Temp 97.7f

## 2021-02-09 NOTE — BRIEF OPERATIVE NOTE - NSICDXBRIEFPROCEDURE_GEN_ALL_CORE_FT
PROCEDURES:  Insertion, penile prosthesis, 700 CX 09-Feb-2021 12:03:01  Marek Schaefer  Bilateral vasectomy 09-Feb-2021 12:02:41  Marek Schaefer

## 2021-02-09 NOTE — ASU PATIENT PROFILE, ADULT - MEDICATION HERBAL REMEDIES, PROFILE
Pre-application: Motor, sensory, and vascular responses intact in the injured extremity./Post-application: Motor, sensory, and vascular responses intact in the injured extremity./The patient/caregiver verbalized understanding of how to care for the injured extremity with splint
no

## 2021-02-09 NOTE — ASU DISCHARGE PLAN (ADULT/PEDIATRIC) - CARE PROVIDER_API CALL
Marek Schaefer  UROLOGY  [address] [place] [address], [address]  [place] [address]  Phone: [phone]  Fax: [phone]  Follow Up Time:

## 2021-02-09 NOTE — BRIEF OPERATIVE NOTE - NSICDXBRIEFPOSTOP_GEN_ALL_CORE_FT
POST-OP DIAGNOSIS:  Erectile dysfunction due to arterial insufficiency 09-Feb-2021 12:04:02  Marek Schaefer  Vasectomy status 09-Feb-2021 12:03:50  Marek Schaefer

## 2021-02-09 NOTE — ASU DISCHARGE PLAN (ADULT/PEDIATRIC) - ASU DC SPECIAL INSTRUCTIONSFT
keep heller, d/c home with leg bag  educate pt on how to drain heller leg bag  pt is to measure BRETT output every 4-6 hours   dressing stays on for 48 hours   no strenuous activity for 2 weeks   can shower after 2 days   pt is to follow up as outpatient tomorrow with Dr. Schaefer for heller & BRETT removal

## 2021-02-10 ENCOUNTER — APPOINTMENT (OUTPATIENT)
Dept: UROLOGY | Facility: CLINIC | Age: 56
End: 2021-02-10
Payer: COMMERCIAL

## 2021-02-10 VITALS
BODY MASS INDEX: 29.16 KG/M2 | DIASTOLIC BLOOD PRESSURE: 70 MMHG | HEART RATE: 80 BPM | HEIGHT: 73 IN | TEMPERATURE: 97.8 F | SYSTOLIC BLOOD PRESSURE: 122 MMHG | WEIGHT: 220 LBS

## 2021-02-10 PROCEDURE — 99024 POSTOP FOLLOW-UP VISIT: CPT

## 2021-02-10 NOTE — HISTORY OF PRESENT ILLNESS
[FreeTextEntry1] : IPP yesterday\par heller and BRETT removed today\par follow up Feb 22 to deflate implant\par complete meds -- especially abx\par will need semen analsysis in 8 weeks before engaging in sex without birth control

## 2021-02-12 ENCOUNTER — APPOINTMENT (OUTPATIENT)
Dept: UROLOGY | Facility: CLINIC | Age: 56
End: 2021-02-12
Payer: COMMERCIAL

## 2021-02-12 VITALS — HEART RATE: 96 BPM | SYSTOLIC BLOOD PRESSURE: 142 MMHG | DIASTOLIC BLOOD PRESSURE: 77 MMHG | TEMPERATURE: 97.7 F

## 2021-02-12 DIAGNOSIS — N52.9 MALE ERECTILE DYSFUNCTION, UNSPECIFIED: ICD-10-CM

## 2021-02-12 PROCEDURE — 99024 POSTOP FOLLOW-UP VISIT: CPT

## 2021-02-13 DIAGNOSIS — Z87.891 PERSONAL HISTORY OF NICOTINE DEPENDENCE: ICD-10-CM

## 2021-02-13 DIAGNOSIS — N52.01 ERECTILE DYSFUNCTION DUE TO ARTERIAL INSUFFICIENCY: ICD-10-CM

## 2021-02-13 DIAGNOSIS — F32.9 MAJOR DEPRESSIVE DISORDER, SINGLE EPISODE, UNSPECIFIED: ICD-10-CM

## 2021-02-13 DIAGNOSIS — Z30.2 ENCOUNTER FOR STERILIZATION: ICD-10-CM

## 2021-02-19 PROBLEM — N52.9 ERECTILE DYSFUNCTION: Status: ACTIVE | Noted: 2020-02-19

## 2021-02-19 NOTE — HISTORY OF PRESENT ILLNESS
[FreeTextEntry1] : IPP last weeky\par follow up to deflate implant\par complete meds -- especially abx\par will need semen analsysis at 8 weeks post vasectomy

## 2021-02-22 ENCOUNTER — APPOINTMENT (OUTPATIENT)
Dept: UROLOGY | Facility: CLINIC | Age: 56
End: 2021-02-22

## 2021-03-17 ENCOUNTER — APPOINTMENT (OUTPATIENT)
Dept: UROLOGY | Facility: CLINIC | Age: 56
End: 2021-03-17
Payer: COMMERCIAL

## 2021-03-17 VITALS — TEMPERATURE: 97.5 F | HEIGHT: 73 IN | WEIGHT: 221 LBS | BODY MASS INDEX: 29.29 KG/M2

## 2021-03-17 DIAGNOSIS — Z45.9: ICD-10-CM

## 2021-03-17 PROCEDURE — 99213 OFFICE O/P EST LOW 20 MIN: CPT | Mod: 24

## 2021-03-17 PROCEDURE — 99072 ADDL SUPL MATRL&STAF TM PHE: CPT

## 2021-03-17 NOTE — HISTORY OF PRESENT ILLNESS
[FreeTextEntry1] : IPP last month\par follow up to learn to adjust and manipulate device\par he was able to inflate and deflate today although he was still sensitive\par \par will need semen analsysis at 8 weeks post vasectomy \par  \par

## 2021-05-26 ENCOUNTER — APPOINTMENT (OUTPATIENT)
Dept: UROLOGY | Facility: CLINIC | Age: 56
End: 2021-05-26

## 2021-12-17 ENCOUNTER — TRANSCRIPTION ENCOUNTER (OUTPATIENT)
Age: 56
End: 2021-12-17

## 2021-12-17 ENCOUNTER — NON-APPOINTMENT (OUTPATIENT)
Age: 56
End: 2021-12-17

## 2021-12-17 LAB
PSA FREE FLD-MCNC: 46 %
PSA FREE SERPL-MCNC: 0.21 NG/ML
PSA SERPL-MCNC: 0.46 NG/ML

## 2022-02-01 ENCOUNTER — APPOINTMENT (OUTPATIENT)
Dept: UROLOGY | Facility: CLINIC | Age: 57
End: 2022-02-01
Payer: COMMERCIAL

## 2022-02-01 VITALS
BODY MASS INDEX: 29.29 KG/M2 | HEIGHT: 73 IN | WEIGHT: 221 LBS | HEART RATE: 98 BPM | DIASTOLIC BLOOD PRESSURE: 66 MMHG | SYSTOLIC BLOOD PRESSURE: 120 MMHG

## 2022-02-01 DIAGNOSIS — Z30.09 ENCOUNTER FOR OTHER GENERAL COUNSELING AND ADVICE ON CONTRACEPTION: ICD-10-CM

## 2022-02-01 PROCEDURE — 99213 OFFICE O/P EST LOW 20 MIN: CPT

## 2022-02-01 NOTE — ASSESSMENT
[FreeTextEntry1] : His PSA is normal the implant looks great however he never got a semen analysis until he does he needs to use barrier contraceptives with anyone who might possibly get pregnant.  We will arrange for a semen analysis and he will follow-up with Dr. Schaefer if no other reason that he likes to follow his surgeries Hpi Title: Evaluation of Skin Lesions How Severe Are Your Spot(S)?: mild Have Your Spot(S) Been Treated In The Past?: has been treated When Was It Treated?: 06/13/2018

## 2022-02-01 NOTE — PHYSICAL EXAM
[General Appearance - Well Developed] : well developed [General Appearance - Well Nourished] : well nourished [Normal Appearance] : normal appearance [Well Groomed] : well groomed [General Appearance - In No Acute Distress] : no acute distress [Abdomen Soft] : soft [Abdomen Tenderness] : non-tender [Costovertebral Angle Tenderness] : no ~M costovertebral angle tenderness [Penis Abnormality] : normal circumcised penis [Epididymis] : the epididymides were normal [Testes Tenderness] : no tenderness of the testes [FreeTextEntry1] : Implant is in excellent position the scrotal incision is clean I can feel a vas defect on both sides and I did not feel any granulomas [Heart Rate And Rhythm] : Heart rate and rhythm were normal [] : no respiratory distress [Respiration, Rhythm And Depth] : normal respiratory rhythm and effort [Exaggerated Use Of Accessory Muscles For Inspiration] : no accessory muscle use [Auscultation Breath Sounds / Voice Sounds] : lungs were clear to auscultation bilaterally [Oriented To Time, Place, And Person] : oriented to person, place, and time [Affect] : the affect was normal [Mood] : the mood was normal [Not Anxious] : not anxious [Normal Station and Gait] : the gait and station were normal for the patient's age

## 2022-02-01 NOTE — LETTER HEADER
[FreeTextEntry3] : Omayra Rutherford M.D.\par Director Emeritus of Urology\par [facility]\par [address] [place] [address], [address]\[place] [address]

## 2022-02-01 NOTE — HISTORY OF PRESENT ILLNESS
[FreeTextEntry1] : Sean is a 56-year-old male born June 16, 1965 who I had seen back in 2020 eventually sending him to Dr. Scheafer who put in an implant in March 2021.  He saw him March 17 things look good he has not been seen since.  He had a PSA done in December and given that Dr. Schaefer did a vasectomy at the time of the implant semen analysis was requested but was not yet done.  He is in the middle of the divorce and knows that if he is having sex with a woman that is potentially able to get pregnant he needs to use barrier contraceptives until we have a semen analysis that shows no sperm.\par \par He is here for an exam and review of the labs\par \par He denies any trouble urinating and says the implant is working perfectly

## 2022-02-01 NOTE — LETTER BODY
[Dear  ___] : Dear  [unfilled], [Courtesy Letter:] : I had the pleasure of seeing your patient, [unfilled], in my office today. [Please see my note below.] : Please see my note below. [Sincerely,] : Sincerely, [FreeTextEntry2] : Mal Brandt MD\par [address]\[place] [address]

## 2022-04-04 ENCOUNTER — APPOINTMENT (OUTPATIENT)
Dept: UROLOGY | Facility: CLINIC | Age: 57
End: 2022-04-04
Payer: COMMERCIAL

## 2022-04-04 DIAGNOSIS — N52.01 ERECTILE DYSFUNCTION DUE TO ARTERIAL INSUFFICIENCY: ICD-10-CM

## 2022-04-04 DIAGNOSIS — Z98.52 VASECTOMY STATUS: ICD-10-CM

## 2022-04-04 PROCEDURE — 99213 OFFICE O/P EST LOW 20 MIN: CPT | Mod: 95

## 2022-04-04 NOTE — ASSESSMENT
[FreeTextEntry1] : 55 yo \par \par IPP last year\par happy with results able to have satisfactory sex\par  \par  Feb 2022\par Complete Semen Analysis; no sperm seen\par \par doing well\par

## 2022-04-04 NOTE — HISTORY OF PRESENT ILLNESS
[Home] : at home, [unfilled] , at the time of the visit. [Medical Office: ([facility])___] : at the medical office located in  [Verbal consent obtained from patient] : the patient, [unfilled] [FreeTextEntry1] : 57 yo \par \par IPP last year\par happy with results able to have satisfactory sex\par  \par  Feb 2022\par Complete Semen Analysis; no sperm seen\par \par doing well\par

## 2022-09-13 ENCOUNTER — NON-APPOINTMENT (OUTPATIENT)
Age: 57
End: 2022-09-13

## 2023-02-13 ENCOUNTER — NON-APPOINTMENT (OUTPATIENT)
Age: 58
End: 2023-02-13

## 2023-02-13 LAB
PSA FREE FLD-MCNC: 48 %
PSA FREE SERPL-MCNC: 0.17 NG/ML
PSA SERPL-MCNC: 0.36 NG/ML

## 2023-04-04 ENCOUNTER — APPOINTMENT (OUTPATIENT)
Dept: UROLOGY | Facility: CLINIC | Age: 58
End: 2023-04-04
Payer: COMMERCIAL

## 2023-04-04 VITALS
BODY MASS INDEX: 28.49 KG/M2 | WEIGHT: 215 LBS | DIASTOLIC BLOOD PRESSURE: 75 MMHG | HEIGHT: 73 IN | TEMPERATURE: 98 F | SYSTOLIC BLOOD PRESSURE: 126 MMHG

## 2023-04-04 DIAGNOSIS — Z00.00 ENCOUNTER FOR GENERAL ADULT MEDICAL EXAMINATION W/OUT ABNORMAL FINDINGS: ICD-10-CM

## 2023-04-04 PROCEDURE — 99213 OFFICE O/P EST LOW 20 MIN: CPT

## 2023-04-04 RX ORDER — LAMOTRIGINE 150 MG/1
TABLET ORAL
Refills: 0 | Status: COMPLETED | COMMUNITY

## 2023-04-04 RX ORDER — BUPROPION HYDROCHLORIDE 150 MG/1
150 TABLET, FILM COATED, EXTENDED RELEASE ORAL
Refills: 0 | Status: ACTIVE | COMMUNITY

## 2023-04-04 RX ORDER — TADALAFIL 20 MG/1
20 TABLET ORAL
Qty: 6 | Refills: 5 | Status: COMPLETED | OUTPATIENT
Start: 2020-01-07 | End: 2023-04-04

## 2023-04-04 NOTE — LETTER HEADER
[FreeTextEntry3] : Omayra Rutherford M.D.\par Director Emeritus of Urology\par [facility]\par [address] [place] [address], [address]\[place] [address]

## 2023-04-04 NOTE — PHYSICAL EXAM
[General Appearance - Well Developed] : well developed [General Appearance - Well Nourished] : well nourished [Normal Appearance] : normal appearance [Well Groomed] : well groomed [General Appearance - In No Acute Distress] : no acute distress [Abdomen Soft] : soft [Abdomen Tenderness] : non-tender [Abdomen Hernia] : no hernia was discovered [Costovertebral Angle Tenderness] : no ~M costovertebral angle tenderness [Urethral Meatus] : meatus normal [FreeTextEntry1] : implant in place and in good position works well [Edema] : no peripheral edema [] : no respiratory distress [Respiration, Rhythm And Depth] : normal respiratory rhythm and effort [Exaggerated Use Of Accessory Muscles For Inspiration] : no accessory muscle use [Oriented To Time, Place, And Person] : oriented to person, place, and time [Affect] : the affect was normal [Mood] : the mood was normal [Not Anxious] : not anxious [Normal Station and Gait] : the gait and station were normal for the patient's age

## 2023-04-04 NOTE — ASSESSMENT
[FreeTextEntry1] : His PSA is excellent and should not be rechecked for 2 years he is voiding well he has no trouble with the implant he was cautioned that it is a mechanical device and he has to be careful with that.  It is a balloon and if he pops it would have to be replaced surgically\par \par If his PCP will check his PSA and its normal he does not have to come back unless he has issues.  If the PCP prefers that we do it he will get 1 and 2 years of follow-up.  We cannot order 1 now as the computer will not hold 1 for 2 years he can contact us in 18 months and we will order a time

## 2023-04-04 NOTE — HISTORY OF PRESENT ILLNESS
[FreeTextEntry1] : Sean is a 57-year-old male born June 16, 1965 not seen since February 1, 2022.  He had had an implant put in by Dr. Schaefer who last saw him in April 2022 by [facility] and released him from his care as the implant is doing well and shows would follow-up if needed.  He is here today for yearly follow-up.  He had a PSA done on February 11 tells me he has no trouble urinating has been using the implant with good success and is able to reach orgasm without trouble. [None] : no symptoms
